# Patient Record
Sex: FEMALE | Race: WHITE | NOT HISPANIC OR LATINO | Employment: PART TIME | URBAN - METROPOLITAN AREA
[De-identification: names, ages, dates, MRNs, and addresses within clinical notes are randomized per-mention and may not be internally consistent; named-entity substitution may affect disease eponyms.]

---

## 2024-08-15 ENCOUNTER — APPOINTMENT (OUTPATIENT)
Dept: PRIMARY CARE | Facility: CLINIC | Age: 34
End: 2024-08-15

## 2024-08-15 VITALS
HEIGHT: 63 IN | OXYGEN SATURATION: 97 % | HEART RATE: 91 BPM | WEIGHT: 122 LBS | SYSTOLIC BLOOD PRESSURE: 99 MMHG | DIASTOLIC BLOOD PRESSURE: 67 MMHG | BODY MASS INDEX: 21.62 KG/M2 | TEMPERATURE: 98.2 F

## 2024-08-15 DIAGNOSIS — Z00.00 ROUTINE HEALTH MAINTENANCE: Primary | ICD-10-CM

## 2024-08-15 PROBLEM — K12.1 MOUTH ULCER: Status: ACTIVE | Noted: 2024-08-15

## 2024-08-15 PROBLEM — F41.8 DEPRESSION WITH ANXIETY: Status: ACTIVE | Noted: 2024-08-15

## 2024-08-15 PROCEDURE — 99385 PREV VISIT NEW AGE 18-39: CPT | Performed by: FAMILY MEDICINE

## 2024-08-15 PROCEDURE — 3008F BODY MASS INDEX DOCD: CPT | Performed by: FAMILY MEDICINE

## 2024-08-15 PROCEDURE — 1036F TOBACCO NON-USER: CPT | Performed by: FAMILY MEDICINE

## 2024-08-15 SDOH — ECONOMIC STABILITY: TRANSPORTATION INSECURITY
IN THE PAST 12 MONTHS, HAS LACK OF TRANSPORTATION KEPT YOU FROM MEETINGS, WORK, OR FROM GETTING THINGS NEEDED FOR DAILY LIVING?: NO

## 2024-08-15 SDOH — HEALTH STABILITY: PHYSICAL HEALTH: ON AVERAGE, HOW MANY MINUTES DO YOU ENGAGE IN EXERCISE AT THIS LEVEL?: 150+ MIN

## 2024-08-15 SDOH — HEALTH STABILITY: PHYSICAL HEALTH: ON AVERAGE, HOW MANY DAYS PER WEEK DO YOU ENGAGE IN MODERATE TO STRENUOUS EXERCISE (LIKE A BRISK WALK)?: 4 DAYS

## 2024-08-15 SDOH — ECONOMIC STABILITY: TRANSPORTATION INSECURITY
IN THE PAST 12 MONTHS, HAS THE LACK OF TRANSPORTATION KEPT YOU FROM MEDICAL APPOINTMENTS OR FROM GETTING MEDICATIONS?: NO

## 2024-08-15 ASSESSMENT — ENCOUNTER SYMPTOMS
FEVER: 0
SHORTNESS OF BREATH: 0
DIZZINESS: 0
BACK PAIN: 0
COUGH: 0

## 2024-08-15 ASSESSMENT — SOCIAL DETERMINANTS OF HEALTH (SDOH): HOW HARD IS IT FOR YOU TO PAY FOR THE VERY BASICS LIKE FOOD, HOUSING, MEDICAL CARE, AND HEATING?: NOT HARD AT ALL

## 2024-08-15 ASSESSMENT — PATIENT HEALTH QUESTIONNAIRE - PHQ9
2. FEELING DOWN, DEPRESSED OR HOPELESS: NOT AT ALL
1. LITTLE INTEREST OR PLEASURE IN DOING THINGS: NOT AT ALL
SUM OF ALL RESPONSES TO PHQ9 QUESTIONS 1 & 2: 0

## 2024-08-15 NOTE — PROGRESS NOTES
"Subjective   Patient ID: Brandee Calderon is a 34 y.o. female who presents for Annual Exam.    HPI     Here today for CPE.  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  Sleeps well  No changes in family history since last visit      Review of Systems   Constitutional:  Negative for fever.   Respiratory:  Negative for cough and shortness of breath.    Cardiovascular:  Negative for chest pain.   Musculoskeletal:  Negative for back pain.   Neurological:  Negative for dizziness.   All other systems reviewed and are negative.      Objective   BP 99/67   Pulse 91   Temp 36.8 °C (98.2 °F) (Temporal)   Ht 1.588 m (5' 2.5\")   Wt 55.3 kg (122 lb)   LMP 07/26/2024 (Approximate)   SpO2 97%   BMI 21.96 kg/m²     Physical Exam  Vitals reviewed.   Constitutional:       General: She is not in acute distress.     Appearance: Normal appearance. She is well-developed.   HENT:      Head: Normocephalic.      Right Ear: Tympanic membrane, ear canal and external ear normal.      Left Ear: Tympanic membrane, ear canal and external ear normal.      Nose: Nose normal.      Mouth/Throat:      Mouth: Mucous membranes are moist.   Eyes:      Conjunctiva/sclera: Conjunctivae normal.   Neck:      Thyroid: No thyromegaly.      Vascular: No JVD.   Cardiovascular:      Rate and Rhythm: Normal rate and regular rhythm.      Heart sounds: Normal heart sounds.   Pulmonary:      Effort: Pulmonary effort is normal.      Breath sounds: Normal breath sounds.   Musculoskeletal:      Right lower leg: No edema.      Left lower leg: No edema.   Lymphadenopathy:      Cervical: No cervical adenopathy.   Skin:     Findings: No rash.   Neurological:      Mental Status: She is alert and oriented to person, place, and time.   Psychiatric:         Mood and Affect: Mood normal.         " Behavior: Behavior normal.         Assessment/Plan   Assessment & Plan  Routine health maintenance    Orders:    Hepatitis B Surface Antibody; Future    Varicella Zoster Antibody, IgG; Future    T-Spot TB; Future    Completed paperwork for OT classes  She is up-to-date on Tdap vaccine (received in 2019) and we have previous lab results from 2021 for MMR titers which confirms that she is immune.  She also needs documentation of immunity to hepatitis B and varicella.  We do not have these vaccines on file so we will check titers  TB screening is also required for her classes.  Will check a T spot to screen for TB  She declines referral to gynecology for Pap testing at this time  Follow-up in 1 year, or earlier if needed

## 2024-08-21 ENCOUNTER — LAB (OUTPATIENT)
Dept: LAB | Facility: LAB | Age: 34
End: 2024-08-21

## 2024-08-21 DIAGNOSIS — Z00.00 ROUTINE HEALTH MAINTENANCE: ICD-10-CM

## 2024-08-21 LAB
HBV SURFACE AB SER-ACNC: <3.1 MIU/ML
VARICELLA ZOSTER IGG INDEX: 3.1 IA
VZV IGG SER QL IA: POSITIVE

## 2024-08-21 PROCEDURE — 86706 HEP B SURFACE ANTIBODY: CPT

## 2024-08-21 PROCEDURE — 86481 TB AG RESPONSE T-CELL SUSP: CPT

## 2024-08-21 PROCEDURE — 86787 VARICELLA-ZOSTER ANTIBODY: CPT

## 2024-08-21 PROCEDURE — 36415 COLL VENOUS BLD VENIPUNCTURE: CPT

## 2024-08-23 LAB
NIL(NEG) CONTROL SPOT COUNT: NORMAL
PANEL A SPOT COUNT: 0
PANEL B SPOT COUNT: 1
POS CONTROL SPOT COUNT: NORMAL
T-SPOT. TB INTERPRETATION: NEGATIVE

## 2024-09-05 ENCOUNTER — APPOINTMENT (OUTPATIENT)
Dept: PRIMARY CARE | Facility: CLINIC | Age: 34
End: 2024-09-05

## 2025-02-07 ENCOUNTER — OFFICE VISIT (OUTPATIENT)
Dept: URGENT CARE | Age: 35
End: 2025-02-07

## 2025-02-07 VITALS
RESPIRATION RATE: 20 BRPM | OXYGEN SATURATION: 98 % | SYSTOLIC BLOOD PRESSURE: 110 MMHG | HEIGHT: 63 IN | TEMPERATURE: 97.8 F | HEART RATE: 110 BPM | WEIGHT: 120 LBS | BODY MASS INDEX: 21.26 KG/M2 | DIASTOLIC BLOOD PRESSURE: 71 MMHG

## 2025-02-07 DIAGNOSIS — J02.0 STREP PHARYNGITIS: Primary | ICD-10-CM

## 2025-02-07 LAB — POC RAPID STREP: POSITIVE

## 2025-02-07 RX ORDER — AMOXICILLIN 500 MG/1
500 CAPSULE ORAL EVERY 12 HOURS SCHEDULED
Qty: 20 CAPSULE | Refills: 0 | Status: SHIPPED | OUTPATIENT
Start: 2025-02-07 | End: 2025-02-17

## 2025-02-07 RX ADMIN — Medication 10 MG: at 08:42

## 2025-02-07 ASSESSMENT — ENCOUNTER SYMPTOMS: SORE THROAT: 1

## 2025-02-07 NOTE — PROGRESS NOTES
"Subjective   Patient ID: Brandee Calderon is a 34 y.o. female. They present today with a chief complaint of Sore Throat (X 2 days, painful swallow, tonsils appear swollen ).    History of Present Illness  Subjective  Brandee Calderon is a 34 y.o. female who presents for evaluation of sore throat. Associated symptoms include chills, hoarseness, pain while swallowing, sore throat, swollen glands, and white spots in throat. Onset of symptoms was 2 days ago, and have been gradually worsening since that time. She is drinking plenty of fluids. She has had a recent close exposure to someone with proven streptococcal pharyngitis.    Review of Systems   Constitutional:  Endorses fever, chills, malaise, fatigue  ENT: See HPI  Respiratory:  Denies cough, sputum production, shortness of breath, stridor, wheezing.    Gastrointestinal:  Endorses/Denies abdominal pain, nausea, vomiting, diarrhea.     Integumentary: Endorses/Kelvin rash.    All other systems are negative            History provided by:  Patient   used: No    Sore Throat         Past Medical History  Allergies as of 02/07/2025    (No Known Allergies)       (Not in a hospital admission)       History reviewed. No pertinent past medical history.    Past Surgical History:   Procedure Laterality Date    OTHER SURGICAL HISTORY  11/06/2019    No history of surgery        reports that she has quit smoking. Her smoking use included cigarettes. She has never used smokeless tobacco. She reports current alcohol use. She reports that she does not use drugs.    Review of Systems  Review of Systems   HENT:  Positive for sore throat.                                   Objective    Vitals:    02/07/25 0826   BP: 110/71   Pulse: 110   Resp: 20   Temp: 36.6 °C (97.8 °F)   SpO2: 98%   Weight: 54.4 kg (120 lb)   Height: 1.6 m (5' 3\")     No LMP recorded.    Physical Exam  Vitals and nursing note reviewed.   Constitutional:       General: She is not in acute distress.     " Appearance: Normal appearance. She is normal weight. She is ill-appearing. She is not toxic-appearing or diaphoretic.   HENT:      Nose: No congestion.      Mouth/Throat:      Mouth: Mucous membranes are moist.      Pharynx: Pharyngeal swelling, oropharyngeal exudate and posterior oropharyngeal erythema present. No uvula swelling or postnasal drip.      Tonsils: Tonsillar exudate present. No tonsillar abscesses. 2+ on the right. 2+ on the left.   Eyes:      General: No scleral icterus.        Right eye: No discharge.         Left eye: No discharge.      Conjunctiva/sclera: Conjunctivae normal.   Cardiovascular:      Rate and Rhythm: Normal rate and regular rhythm.      Pulses: Normal pulses.      Heart sounds: Normal heart sounds.   Pulmonary:      Effort: Pulmonary effort is normal. No respiratory distress.      Breath sounds: Normal breath sounds. No stridor. No wheezing, rhonchi or rales.   Skin:     General: Skin is warm and dry.      Coloration: Skin is not jaundiced or pale.      Findings: No bruising, erythema or rash.   Neurological:      General: No focal deficit present.      Mental Status: She is alert and oriented to person, place, and time.         Procedures    Point of Care Test & Imaging Results from this visit  Results for orders placed or performed in visit on 02/07/25   POCT rapid strep A manually resulted   Result Value Ref Range    POC Rapid Strep Positive (A) Negative      No results found.    Diagnostic study results (if any) were reviewed by TRACEE Cerda.    Assessment/Plan   Allergies, medications, history, and pertinent labs/EKGs/Imaging reviewed by TRACEE Cerda.     Medical Decision Making    Based on history and physical exam- findings consistent with strep pharyngitis and confirmed with strep test. No evidence of PTA, deep neck infection, or sepsis. Will start on antibiotics today.? Encouraged continuation of symptomatic and supportive care?measures. Advised to  follow-up with primary care provider in 3-5 days if symptoms persist, return with any new or worsening symptoms.? Discussed when to seek emergent care. Patient verbalized understanding and agreeable with plan.???     Testing: Strep - positive    Treatment: Amoxicillin, dexamethasone before DC    Differential: 1) Strep , 2) Peritonsillar abscess , 3) Viral pharyngitis    Impression Strep    We discussed with the patient our clinical thoughts at this time given the above findings and clinical assessment and we had a shared decision-making conversation in a patient-centered decision-making model on how to proceed forward. The patient was instructed on the importance of a close follow-up with PCP and other care providers. The patient was also advised that an Urgent care diagnosis is often a preliminary impression and that definitive care is often not able to be given completley in the Urgent care setting.     At time of discharge patient was clinically well-appearing and HDS for outpatient management. The patient was educated regarding diagnosis, supportive care, OTC and Rx medications. The patient was given the opportunity to ask questions prior to discharge.  They verbalized understanding of my discussion of the plans for treatment, expected course, indications to return to  or seek further evaluation in ED, and the need for timely follow up as directed.   They were provided with a work/school excuse if requested.       Orders and Diagnoses  Diagnoses and all orders for this visit:  Sore throat  -     POCT rapid strep A manually resulted      Medical Admin Record      Patient disposition: Home    Electronically signed by TRACEE Cerda  8:30 AM

## 2025-02-07 NOTE — PATIENT INSTRUCTIONS
Your rapid strep test was positive  Take antibiotics as prescribed and until finished  Failure to take entire course of antibiotic can lead to reinfection or resistant bacteria  Frequent handwashing  Rest and increase clear liquid intake  Use humidifier  Lozenges for throat discomfort  Acetaminophen or ibuprofen for pain  Use warm saltwater gargles  Avoid sharing saliva or any objects that may have come into contact with saliva  Throw away toothbrush today and after antibiotics are finished  Follow-up with primary in 4-5 days  Return for any new or worsening symptoms  Proceed to nearest emergency room for any life-threatening s/s previously discussed - difficulty breathing or swallowing

## 2025-02-07 NOTE — LETTER
February 7, 2025     Patient: Brandee Calderon   YOB: 1990   Date of Visit: 2/7/2025       To Whom It May Concern:    It is my medical opinion that Brandee Calderon may return to work on 02/09/2025 .    If you have any questions or concerns, please don't hesitate to call.         Sincerely,        Garland Manjarrez, ZACKARY-CNP    CC: No Recipients